# Patient Record
Sex: FEMALE | Race: WHITE
[De-identification: names, ages, dates, MRNs, and addresses within clinical notes are randomized per-mention and may not be internally consistent; named-entity substitution may affect disease eponyms.]

---

## 2017-04-20 ENCOUNTER — HOSPITAL ENCOUNTER (OUTPATIENT)
Dept: HOSPITAL 45 - C.LAB1850 | Age: 72
Discharge: HOME | End: 2017-04-20
Attending: INTERNAL MEDICINE
Payer: COMMERCIAL

## 2017-04-20 DIAGNOSIS — E78.5: ICD-10-CM

## 2017-04-20 DIAGNOSIS — E03.9: Primary | ICD-10-CM

## 2017-04-20 LAB
ALT SERPL-CCNC: 23 U/L (ref 12–78)
AST SERPL-CCNC: 17 U/L (ref 15–37)
CHOLEST/HDLC SERPL: 2.6 {RATIO}
GLUCOSE UR QL: 63 MG/DL
KETONES UR QL STRIP: 64 MG/DL
NITRITE UR QL STRIP: 182 MG/DL (ref 0–150)
PH UR: 163 MG/DL (ref 0–200)
TSH SERPL-ACNC: 1.42 UIU/ML (ref 0.3–4.5)
VERY LOW DENSITY LIPOPROT CALC: 36 MG/DL

## 2017-04-24 ENCOUNTER — HOSPITAL ENCOUNTER (OUTPATIENT)
Dept: HOSPITAL 45 - C.NEUR | Age: 72
Discharge: HOME | End: 2017-04-24
Attending: PHYSICIAN ASSISTANT
Payer: COMMERCIAL

## 2017-04-24 ENCOUNTER — HOSPITAL ENCOUNTER (OUTPATIENT)
Dept: HOSPITAL 45 - C.RAD1850 | Age: 72
Discharge: HOME | End: 2017-04-24
Attending: INTERNAL MEDICINE
Payer: COMMERCIAL

## 2017-04-24 VITALS
WEIGHT: 203.93 LBS | HEIGHT: 64.02 IN | HEIGHT: 64.02 IN | WEIGHT: 203.93 LBS | BODY MASS INDEX: 34.82 KG/M2 | BODY MASS INDEX: 34.82 KG/M2

## 2017-04-24 VITALS — HEART RATE: 75 BPM | DIASTOLIC BLOOD PRESSURE: 74 MMHG | SYSTOLIC BLOOD PRESSURE: 120 MMHG

## 2017-04-24 DIAGNOSIS — G47.33: ICD-10-CM

## 2017-04-24 DIAGNOSIS — G47.33: Primary | ICD-10-CM

## 2017-04-24 DIAGNOSIS — M79.671: Primary | ICD-10-CM

## 2017-04-24 NOTE — DIAGNOSTIC IMAGING REPORT
RIGHT FOOT MIN 3 VIEWS ROUTINE



CLINICAL HISTORY: Right foot pain     



COMPARISON: None.



DISCUSSION: There are moderate osteoarthritic changes the level the first

metatarsal phalangeal joint. No acute fractures are visualized. There are no

erosive or destructive changes.    



IMPRESSION: 

1. No acute fractures

2. Osteoarthritic changes at the level of the first metatarsal phalangeal joint.







Electronically signed by:  Jl Del Angel M.D.

4/24/2017 10:50 AM



Dictated Date/Time:  4/24/2017 10:49 AM

## 2017-06-22 ENCOUNTER — HOSPITAL ENCOUNTER (OUTPATIENT)
Dept: HOSPITAL 45 - C.MAMM | Age: 72
Discharge: HOME | End: 2017-06-22
Attending: INTERNAL MEDICINE
Payer: COMMERCIAL

## 2017-06-22 ENCOUNTER — HOSPITAL ENCOUNTER (OUTPATIENT)
Dept: HOSPITAL 45 - C.LAB1850 | Age: 72
Discharge: HOME | End: 2017-06-22
Attending: INTERNAL MEDICINE
Payer: COMMERCIAL

## 2017-06-22 DIAGNOSIS — N63: ICD-10-CM

## 2017-06-22 DIAGNOSIS — N63: Primary | ICD-10-CM

## 2017-06-22 DIAGNOSIS — N25.81: ICD-10-CM

## 2017-06-22 DIAGNOSIS — N18.3: ICD-10-CM

## 2017-06-22 DIAGNOSIS — I12.9: Primary | ICD-10-CM

## 2017-06-22 DIAGNOSIS — E55.9: ICD-10-CM

## 2017-06-22 LAB
ANION GAP SERPL CALC-SCNC: 9 MMOL/L (ref 3–11)
APPEARANCE UR: CLEAR
BILIRUB UR-MCNC: (no result) MG/DL
BUN SERPL-MCNC: 23 MG/DL (ref 7–18)
BUN/CREAT SERPL: 16.3 (ref 10–20)
CALCIUM SERPL-MCNC: 8.8 MG/DL (ref 8.5–10.1)
CHLORIDE SERPL-SCNC: 104 MMOL/L (ref 98–107)
CO2 SERPL-SCNC: 27 MMOL/L (ref 21–32)
COLOR UR: YELLOW
CREAT SERPL-MCNC: 1.4 MG/DL (ref 0.6–1.2)
CREAT UR-MCNC: 13 MG/DL
EOSINOPHIL NFR BLD AUTO: 202 K/UL (ref 130–400)
GLUCOSE SERPL-MCNC: 102 MG/DL (ref 70–99)
HCT VFR BLD CALC: 37.9 % (ref 37–47)
MANUAL MICROSCOPIC REQUIRED?: NO
MCH RBC QN AUTO: 28.7 PG (ref 25–34)
MCHC RBC AUTO-ENTMCNC: 34 G/DL (ref 32–36)
MCV RBC AUTO: 84.4 FL (ref 80–100)
NITRITE UR QL STRIP: (no result)
PH UR STRIP: 6.5 [PH] (ref 4.5–7.5)
PHOSPHATE SERPL-MCNC: 2.7 MG/DL (ref 2.5–4.9)
PMV BLD AUTO: 11.4 FL (ref 7.4–10.4)
POTASSIUM SERPL-SCNC: 3.3 MMOL/L (ref 3.5–5.1)
PROT UR STRIP-MCNC: < 5 MG/DL (ref 0–11.9)
RBC # BLD AUTO: 4.49 M/UL (ref 4.2–5.4)
REVIEW REQ?: NO
SODIUM SERPL-SCNC: 140 MMOL/L (ref 136–145)
SP GR UR STRIP: 1.01 (ref 1–1.03)
URINE EPITHELIAL CELL AUTO: (no result) /LPF (ref 0–5)
URINE PROTIEN/CREAT RATIO: (no result) (ref 0–0.2)
UROBILINOGEN UR-MCNC: (no result) MG/DL
WBC # BLD AUTO: 4.23 K/UL (ref 4.8–10.8)

## 2017-06-22 NOTE — MAMMOGRAPHY REPORT
UNILATERAL LEFT DIGITAL DIAGNOSTIC MAMMOGRAM TOMOSYNTHESIS AND TARGETED LEFT ULTRASOUND: 6/22/2017

CLINICAL HISTORY: The patient reports that she had bruising on her left breast early June after a dog
 jumped up and hit her breast.  Since that time she has felt a lump in the region of the prior bruisi
ng.  





TECHNIQUE:  Breast tomosynthesis in addition to standard 2D mammography was performed.  Left CC and M
LO 2-D and tomosynthesis images were obtained.  



COMPARISON: Comparison is made to exams dated:  6/22/2017 ultrasound, 12/26/2016 mammogram, 12/23/201
5 mammogram, 12/22/2014 ultrasound, 11/10/2014 mammogram, and 11/8/2013 mammogram - [facility].   



BREAST COMPOSITION:  The tissue of the left breast is heterogeneously dense, which may obscure small 
masses.  



FINDINGS:  A triangle marker marks the site of the palpable lump in the left upper inner quadrant.  A
t the site of the palpable lump there is an ill-defined mixed density asymmetry, best seen on the cc 
tomosynthesis images, which measures approximately 11 mm.  Fat density is seen within the asymmetry. 
 The remainder of the left breast is stable compared to prior exams, without suspicious masses, calci
fications, or areas of architectural distortion noted.  Benign-appearing circumscribed mass with asso
ciated coarse calcifications in the left breast at 3:00 is stable.  A linear scar marker denotes a sc
ar on the left upper outer quadrant.  Scattered benign-appearing calcifications are stable.



Targeted ultrasound was performed of the area of the palpable lump pointed out by the patient, in the
 left breast at approximately 10:00, 10 cm from the nipple.  At the site of the palpable lump there i
s a superficial mixed echogenicity ill-defined mass which consist of predominantly hyperechoic tissue
 with a small 3 x 3 mm anechoic cystic mass seen within it.  The total extent of the region measures 
approximately 1.4 x 0.6 x 1.8 cm.  This corresponds with the mammographic asymmetry and is benign and
 compatible with fat necrosis.





IMPRESSION:  ACR BI-RADS CATEGORY 2: BENIGN, TARGETED ULTRASOUND ACR BI-RADS CATEGORY 2: BENIGN 

The palpable lump in the left upper inner quadrant has mammographic and sonographic features of benig
n fat necrosis.  Given the clinical history of an injury to this region with associated bruising, fin
dings are benign and consistent with fat necrosis.  There is no mammographic or targeted sonographic 
evidence of malignancy.  Recommend clinical follow-up for the left breast palpable lump, and recommen
d routine bilateral screening mammograms which are due December 2017.  



The patient has been verbally notified of the results.  





Approximately 10% of breast cancers are not detected with mammography. A negative mammographic report
 should not delay biopsy if a clinically suggestive mass is present.



Florence Cox M.D.          

ah/:6/22/2017 09:05:05  



Imaging Technologist: Skye DYER(MASOOD)(M), [facility]

letter sent: Normal 1/2  

BI-RADS Code: ACR BI-RADS Category 2: Benign  Ultrasound BI-RADS: ACR BI-RADS Category 2: Benign

## 2017-10-10 ENCOUNTER — HOSPITAL ENCOUNTER (OUTPATIENT)
Dept: HOSPITAL 45 - C.LAB1850 | Age: 72
Discharge: HOME | End: 2017-10-10
Payer: COMMERCIAL

## 2017-10-10 DIAGNOSIS — E03.9: ICD-10-CM

## 2017-10-10 DIAGNOSIS — E78.5: ICD-10-CM

## 2017-10-10 DIAGNOSIS — G62.9: ICD-10-CM

## 2017-10-10 DIAGNOSIS — R73.03: ICD-10-CM

## 2017-10-10 DIAGNOSIS — E87.6: Primary | ICD-10-CM

## 2017-10-10 LAB
ANION GAP SERPL CALC-SCNC: 6 MMOL/L (ref 3–11)
BUN SERPL-MCNC: 20 MG/DL (ref 7–18)
BUN/CREAT SERPL: 14.5 (ref 10–20)
CALCIUM SERPL-MCNC: 9.3 MG/DL (ref 8.5–10.1)
CHLORIDE SERPL-SCNC: 103 MMOL/L (ref 98–107)
CHOLEST/HDLC SERPL: 2.2 {RATIO}
CO2 SERPL-SCNC: 30 MMOL/L (ref 21–32)
CREAT SERPL-MCNC: 1.4 MG/DL (ref 0.6–1.2)
EST. AVERAGE GLUCOSE BLD GHB EST-MCNC: 114 MG/DL
GLUCOSE SERPL-MCNC: 100 MG/DL (ref 70–99)
GLUCOSE UR QL: 63 MG/DL
KETONES UR QL STRIP: 45 MG/DL
MAGNESIUM SERPL-MCNC: 2.3 MG/DL (ref 1.8–2.4)
NITRITE UR QL STRIP: 160 MG/DL (ref 0–150)
PH UR: 140 MG/DL (ref 0–200)
POTASSIUM SERPL-SCNC: 3.7 MMOL/L (ref 3.5–5.1)
SODIUM SERPL-SCNC: 139 MMOL/L (ref 136–145)
TSH SERPL-ACNC: 1.18 UIU/ML (ref 0.3–4.5)
VERY LOW DENSITY LIPOPROT CALC: 32 MG/DL

## 2017-10-26 ENCOUNTER — HOSPITAL ENCOUNTER (OUTPATIENT)
Dept: HOSPITAL 45 - C.NEUR | Age: 72
Discharge: HOME | End: 2017-10-26
Attending: INTERNAL MEDICINE
Payer: COMMERCIAL

## 2017-10-26 VITALS
HEIGHT: 64.02 IN | BODY MASS INDEX: 35.15 KG/M2 | BODY MASS INDEX: 35.15 KG/M2 | WEIGHT: 205.91 LBS | WEIGHT: 205.91 LBS | HEIGHT: 64.02 IN

## 2017-10-26 VITALS — DIASTOLIC BLOOD PRESSURE: 82 MMHG | SYSTOLIC BLOOD PRESSURE: 138 MMHG | HEART RATE: 67 BPM

## 2017-10-26 DIAGNOSIS — G47.33: Primary | ICD-10-CM

## 2017-12-18 ENCOUNTER — HOSPITAL ENCOUNTER (OUTPATIENT)
Dept: HOSPITAL 45 - C.LAB1850 | Age: 72
Discharge: HOME | End: 2017-12-18
Attending: INTERNAL MEDICINE
Payer: COMMERCIAL

## 2017-12-18 DIAGNOSIS — N18.3: ICD-10-CM

## 2017-12-18 DIAGNOSIS — E55.9: ICD-10-CM

## 2017-12-18 DIAGNOSIS — I12.9: Primary | ICD-10-CM

## 2017-12-18 DIAGNOSIS — N25.81: ICD-10-CM

## 2017-12-18 LAB
ANION GAP SERPL CALC-SCNC: 8 MMOL/L (ref 3–11)
APPEARANCE UR: CLEAR
BILIRUB UR-MCNC: (no result) MG/DL
BUN SERPL-MCNC: 28 MG/DL (ref 7–18)
BUN/CREAT SERPL: 20.2 (ref 10–20)
CALCIUM SERPL-MCNC: 9.6 MG/DL (ref 8.5–10.1)
CHLORIDE SERPL-SCNC: 100 MMOL/L (ref 98–107)
CO2 SERPL-SCNC: 29 MMOL/L (ref 21–32)
COLOR UR: YELLOW
CREAT SERPL-MCNC: 1.36 MG/DL (ref 0.6–1.2)
CREAT UR-MCNC: 31.1 MG/DL
EOSINOPHIL NFR BLD AUTO: 213 K/UL (ref 130–400)
GLUCOSE SERPL-MCNC: 108 MG/DL (ref 70–99)
HCT VFR BLD CALC: 39.2 % (ref 37–47)
MANUAL MICROSCOPIC REQUIRED?: NO
MCH RBC QN AUTO: 28.9 PG (ref 25–34)
MCHC RBC AUTO-ENTMCNC: 33.9 G/DL (ref 32–36)
MCV RBC AUTO: 85 FL (ref 80–100)
NITRITE UR QL STRIP: (no result)
PH UR STRIP: 7 [PH] (ref 4.5–7.5)
PHOSPHATE SERPL-MCNC: 3.4 MG/DL (ref 2.5–4.9)
PMV BLD AUTO: 10.6 FL (ref 7.4–10.4)
POTASSIUM SERPL-SCNC: 4 MMOL/L (ref 3.5–5.1)
PROT UR STRIP-MCNC: 9.2 MG/DL (ref 0–11.9)
RBC # BLD AUTO: 4.61 M/UL (ref 4.2–5.4)
REVIEW REQ?: NO
SODIUM SERPL-SCNC: 137 MMOL/L (ref 136–145)
SP GR UR STRIP: 1.01 (ref 1–1.03)
URINE EPITHELIAL CELL AUTO: (no result) /LPF (ref 0–5)
URINE PROTIEN/CREAT RATIO: 0.3 (ref 0–0.2)
UROBILINOGEN UR-MCNC: (no result) MG/DL
WBC # BLD AUTO: 4.69 K/UL (ref 4.8–10.8)

## 2018-04-11 ENCOUNTER — HOSPITAL ENCOUNTER (OUTPATIENT)
Dept: HOSPITAL 45 - C.MAMM | Age: 73
Discharge: HOME | End: 2018-04-11
Attending: SURGERY
Payer: COMMERCIAL

## 2018-04-11 DIAGNOSIS — N64.89: Primary | ICD-10-CM

## 2018-04-11 DIAGNOSIS — N63.0: ICD-10-CM

## 2018-04-12 NOTE — MAMMOGRAPHY REPORT
BILATERAL DIGITAL DIAGNOSTIC MAMMOGRAM TOMOSYNTHESIS WITH CAD AND TARGETED RIGHT ULTRASOUND: 4/11/201
8

CLINICAL HISTORY: 73-year-old woman presents at time of annual screening exam and also to ensure reso
lution of a previously palpable area in the medial left breast thought to represent fat necrosis.  





TECHNIQUE: Bilateral breast tomosynthesis in addition to standard 2D mammography was performed. Curre
nt study was also evaluated with a Computer Aided Detection (CAD) system.  



COMPARISON: Comparison is made to exams dated:  6/22/2017 ultrasound, 6/22/2017 mammogram, 12/26/2016
 mammogram, 12/23/2015 mammogram, 12/22/2014 mammogram, and 11/10/2014 mammogram - [facility].   



BREAST COMPOSITION:  The tissue of both breasts is heterogeneously dense, which may obscure small mas
ses.  



FINDINGS: There are circular mole markers overlying each breast.  A stable benign circumscribed 13 mm
 mass in the 3:00 left breast.  Stable ribbon-shaped biopsy marker clip in the 12:00 right breast and
 stable asymmetries in the medial right breast.  An anterior asymmetry in the medial right breast on 
the CC view appears minimally more prominent comparing to recent prior mammograms but still somewhat 
similar dating back to at least 2008.  Nevertheless, further evaluation with ultrasound was performed
.  The previously observed asymmetry in the upper inner middle one third of the left breast has resol
brigida, confirming benign fat necrosis.  No other new suspicious masses, asymmetries, areas of 
ural distortion or suspicious microcalcifications bilaterally.



Targeted ultrasound was performed in the medial right breast.  Sonographically normal tissue is seen 
without a suspicious solid or cystic mass.  The asymmetry most likely represented normal overlapping 
fibroglandular tissue and is considered benign.



IMPRESSION:  ACR BI-RADS CATEGORY 2: BENIGN, TARGETED ULTRASOUND ACR BI-RADS CATEGORY 2: BENIGN 

Resolution of the palpable focal asymmetry in the upper inner middle one third of the left breast, co
nfirming benign fat necrosis.  An asymmetry in the medial right breast most likely represented normal
 overlapping fibroglandular tissue as no suspicious sonographic correlate was seen.  Overall, there i
s no mammographic evidence of malignancy bilaterally.  Recommend routine screening mammography in 1 y
ear.



Approximately 10% of breast cancers are not detected with mammography. A negative mammographic report
 should not delay biopsy if a clinically suggestive mass is present.



Stephanie Chacon M.D.          

ay/:4/11/2018 11:22:32  



Imaging Technologist: Marcela COULTER)SALO), [facility]

letter sent: Normal 1/2  

BI-RADS Code: ACR BI-RADS Category 2: Benign  Ultrasound BI-RADS: ACR BI-RADS Category 2: Benign

## 2018-04-19 ENCOUNTER — HOSPITAL ENCOUNTER (OUTPATIENT)
Dept: HOSPITAL 45 - C.NEUR | Age: 73
Discharge: HOME | End: 2018-04-19
Attending: PHYSICIAN ASSISTANT
Payer: COMMERCIAL

## 2018-04-19 VITALS — HEART RATE: 71 BPM | DIASTOLIC BLOOD PRESSURE: 70 MMHG | SYSTOLIC BLOOD PRESSURE: 131 MMHG

## 2018-04-19 VITALS — HEIGHT: 64.02 IN

## 2018-04-19 DIAGNOSIS — G47.33: Primary | ICD-10-CM

## 2018-04-23 ENCOUNTER — HOSPITAL ENCOUNTER (OUTPATIENT)
Dept: HOSPITAL 45 - C.LAB1850 | Age: 73
Discharge: HOME | End: 2018-04-23
Attending: INTERNAL MEDICINE
Payer: COMMERCIAL

## 2018-04-23 DIAGNOSIS — E78.5: ICD-10-CM

## 2018-04-23 DIAGNOSIS — R73.01: ICD-10-CM

## 2018-04-23 DIAGNOSIS — I10: Primary | ICD-10-CM

## 2018-04-23 DIAGNOSIS — E03.9: ICD-10-CM

## 2018-04-23 LAB
ALT SERPL-CCNC: 22 U/L (ref 12–78)
AST SERPL-CCNC: 16 U/L (ref 15–37)
BUN SERPL-MCNC: 13 MG/DL (ref 7–18)
CALCIUM SERPL-MCNC: 9 MG/DL (ref 8.5–10.1)
CO2 SERPL-SCNC: 30 MMOL/L (ref 21–32)
CREAT SERPL-MCNC: 1.34 MG/DL (ref 0.6–1.2)
GLUCOSE SERPL-MCNC: 93 MG/DL (ref 70–99)
HBA1C MFR BLD: 5.6 % (ref 4.5–5.6)
KETONES UR QL STRIP: 23 MG/DL
PH UR: 106 MG/DL (ref 0–200)
POTASSIUM SERPL-SCNC: 3.5 MMOL/L (ref 3.5–5.1)
SODIUM SERPL-SCNC: 139 MMOL/L (ref 136–145)